# Patient Record
Sex: MALE | Race: WHITE | NOT HISPANIC OR LATINO | Employment: FULL TIME | URBAN - METROPOLITAN AREA
[De-identification: names, ages, dates, MRNs, and addresses within clinical notes are randomized per-mention and may not be internally consistent; named-entity substitution may affect disease eponyms.]

---

## 2017-01-25 ENCOUNTER — TELEPHONE (OUTPATIENT)
Dept: PULMONOLOGY | Facility: HOSPICE | Age: 57
End: 2017-01-25

## 2017-01-25 DIAGNOSIS — R06.00 DYSPNEA, UNSPECIFIED TYPE: ICD-10-CM

## 2017-02-24 ENCOUNTER — TELEPHONE (OUTPATIENT)
Dept: PULMONOLOGY | Facility: HOSPICE | Age: 57
End: 2017-02-24

## 2017-02-24 ENCOUNTER — OFFICE VISIT (OUTPATIENT)
Dept: PULMONOLOGY | Facility: HOSPICE | Age: 57
End: 2017-02-24
Payer: COMMERCIAL

## 2017-02-24 ENCOUNTER — NON-PROVIDER VISIT (OUTPATIENT)
Dept: PULMONOLOGY | Facility: HOSPICE | Age: 57
End: 2017-02-24
Payer: COMMERCIAL

## 2017-02-24 VITALS
SYSTOLIC BLOOD PRESSURE: 120 MMHG | RESPIRATION RATE: 17 BRPM | DIASTOLIC BLOOD PRESSURE: 64 MMHG | WEIGHT: 303 LBS | OXYGEN SATURATION: 89 % | TEMPERATURE: 97.9 F | HEIGHT: 60 IN | HEART RATE: 66 BPM | BODY MASS INDEX: 59.49 KG/M2

## 2017-02-24 VITALS — BODY MASS INDEX: 40.16 KG/M2 | HEIGHT: 73 IN | WEIGHT: 303 LBS

## 2017-02-24 DIAGNOSIS — G47.33 OBSTRUCTIVE SLEEP APNEA: ICD-10-CM

## 2017-02-24 DIAGNOSIS — R06.00 DYSPNEA, UNSPECIFIED TYPE: ICD-10-CM

## 2017-02-24 DIAGNOSIS — E66.09 NON MORBID OBESITY DUE TO EXCESS CALORIES: ICD-10-CM

## 2017-02-24 DIAGNOSIS — J45.30 MILD PERSISTENT ASTHMA WITHOUT COMPLICATION: ICD-10-CM

## 2017-02-24 PROCEDURE — 99204 OFFICE O/P NEW MOD 45 MIN: CPT | Performed by: INTERNAL MEDICINE

## 2017-02-24 RX ORDER — TRAMADOL HYDROCHLORIDE 50 MG/1
TABLET ORAL
COMMUNITY
Start: 2017-02-06

## 2017-02-24 RX ORDER — ZOLPIDEM TARTRATE 10 MG/1
TABLET ORAL
COMMUNITY
Start: 2017-02-06

## 2017-02-24 RX ORDER — ALBUTEROL SULFATE 90 UG/1
AEROSOL, METERED RESPIRATORY (INHALATION)
COMMUNITY
Start: 2017-02-17

## 2017-02-24 RX ORDER — LISINOPRIL 20 MG/1
TABLET ORAL
COMMUNITY
Start: 2017-01-15

## 2017-02-24 ASSESSMENT — PULMONARY FUNCTION TESTS
FEV1/FVC_PERCENT_PREDICTED: 89
FVC: 4.52
FEV1/FVC: 72.26
FEV1_PERCENT_PREDICTED: 76
FVC_PERCENT_PREDICTED: 80
FEV1_PERCENT_CHANGE: 0
FEV1: 3.11
FEV1_PREDICTED: 4.07
FVC: 4.29
FEV1/FVC_PERCENT_CHANGE: 0
FEV1_PERCENT_CHANGE: -5
FEV1: 3.1
FEV1_PERCENT_PREDICTED: 76
FVC_PREDICTED: 5.32
FEV1/FVC_PERCENT_PREDICTED: 77
FEV1/FVC_PERCENT_PREDICTED: 95
FEV1/FVC: 69
FVC_PERCENT_PREDICTED: 85

## 2017-02-24 NOTE — TELEPHONE ENCOUNTER
Echo from [facility] called requesting the ICD-10 code for dyspnea (R06.00) and code for consultation (46674).

## 2017-02-24 NOTE — MR AVS SNAPSHOT
"        Gurinder Buckner   2017 1:00 PM   Non-Provider Visit   MRN: [id number]    Department:  [facility]   Dept Phone:  [phone]    Description:  Male : 1960   Provider:  J Carlos Ovalles M.D.           Reason for Visit     Shortness of Breath           Allergies as of 2017     No Known Allergies      You were diagnosed with     Dyspnea, unspecified type   [[id number]]         Vital Signs     Height Weight Body Mass Index Smoking Status          1.842 m (6' 0.52\") 137.44 kg (303 lb) 40.51 kg/m2 Never Smoker         Basic Information     Date Of Birth Sex Race Ethnicity Preferred Language    1960 Male White Non- English      Your appointments     2017  2:00 PM   New Patient Pulmonary with J Carlos Ovalles M.D.   [facility] (--)    [address]  [address]  [place] [address] [phone]            Mar 08, 2017  5:30 PM   MR EXTREMITY WITHOUT (30) with [address] MRI 1   [facility] IMAGING - MRI - [address]    [address]  [place] [address] [phone]              Problem List              ICD-10-CM Priority Class Noted - Resolved    Abnormal stress ECG with treadmill R94.39   2012 - Present    S/P cardiac catheterization Z98.890   2012 - Present    Chest pain R07.9   2012 - Present    Essential hypertension I10 Medium  2012 - Present    Hypothyroidism E03.9 Medium  2012 - Present    Mixed hyperlipidemia E78.2 Medium  2012 - Present    Murmur R01.1   2012 - Present    Obesity E66.9 Medium  2012 - Present    Obstructive sleep apnea G47.33   2012 - Present    Palpitations R00.2   2012 - Present    Pulmonary Valve Stenosis Q22.3 High  2012 - Present    Mitral and aortic valve disease I08.0   2012 - Present    Erectile dysfunction N52.9 Low  2012 - Present    Chronic kidney disease (CKD), stage II (mild) N18.2 Medium  2012 - Present    DIABETES MELLITUS    " 2/16/2012 - Present    Right carotid bruit R09.89   9/13/2012 - Present    Internal hemorrhoids K64.8   11/10/2014 - Present    Other abnormal granulation tissue L91.8   12/31/2014 - Present      Health Maintenance        Date Due Completion Dates    IMM HEP B VACCINE (1 of 3 - Primary Series) 1960 ---    A1C SCREENING 1960 ---    DIABETES MONOFILAMENT / LE EXAM 1960 ---    RETINAL SCREENING 4/9/1979 ---    FASTING LIPID PROFILE 4/9/1979 ---    URINE ACR / MICROALBUMIN 4/9/1979 ---    IMM DTaP/Tdap/Td Vaccine (1 - Tdap) 4/9/1980 ---    IMM PNEUMOCOCCAL 19-64 (ADULT) MEDIUM RISK SERIES (1 of 1 - PPSV23) 4/9/1980 ---    COLONOSCOPY 4/9/2011 ---    SERUM CREATININE 12/31/2015 12/31/2014, 11/10/2014    IMM INFLUENZA (1) 9/1/2016 ---            Current Immunizations     Influenza TIV (IM) 11/1/2016      Below and/or attached are the medications your provider expects you to take. Review all of your home medications and newly ordered medications with your provider and/or pharmacist. Follow medication instructions as directed by your provider and/or pharmacist. Please keep your medication list with you and share with your provider. Update the information when medications are discontinued, doses are changed, or new medications (including over-the-counter products) are added; and carry medication information at all times in the event of emergency situations     Allergies:  No Known Allergies          Medications  Valid as of: February 24, 2017 -  1:54 PM    Generic Name Brand Name Tablet Size Instructions for use    Albuterol Sulfate (Aero Soln) VENTOLIN  (90 BASE) MCG/ACT         Docusate Sodium (Cap) COLACE 100 MG Take 1 Cap by mouth 2 times a day.        Levothyroxine Sodium (Tab) SYNTHROID 125 MCG Take 125 mcg by mouth 2 Times a Day. Take 2 tablets by mouth daily        Lisinopril (Tab) PRINIVIL 20 MG         Lisinopril-Hydrochlorothiazide (Tab) PRINZIDE, ZESTORETIC 20-25 MG Take 1 Tab by mouth every  day. Indications: High Blood Pressure        Metoprolol Succinate (TABLET SR 24 HR) TOPROL  MG Take 100 mg by mouth every morning.        Polyethylene Glycol 3350 (Powder) MIRALAX  Take 17 g by mouth every day.        RABEprazole Sodium (Tablet Delayed Response) ACIPHEX 20 MG Take 20 mg by mouth every day. Take on empty stomach in AM        Tamsulosin HCl (Cap) FLOMAX 0.4 MG Take 0.4 mg by mouth every evening.        TraMADol HCl (Tab) ULTRAM 50 MG         Zolpidem Tartrate (Tab) AMBIEN 10 MG         .                 Medicines prescribed today were sent to:     [address], [place] [address] [facility]    [address] [facility] [address]    Phone: [phone] Fax: [phone]    Open 24 Hours?: No      Medication refill instructions:       If your prescription bottle indicates you have medication refills left, it is not necessary to call your provider’s office. Please contact your pharmacy and they will refill your medication.    If your prescription bottle indicates you do not have any refills left, you may request refills at any time through one of the following ways: The online Setred system (except Urgent Care), by calling your provider’s office, or by asking your pharmacy to contact your provider’s office with a refill request. Medication refills are processed only during regular business hours and may not be available until the next business day. Your provider may request additional information or to have a follow-up visit with you prior to refilling your medication.   *Please Note: Medication refills are assigned a new Rx number when refilled electronically. Your pharmacy may indicate that no refills were authorized even though a new prescription for the same medication is available at the pharmacy. Please request the medicine by name with the pharmacy before contacting your provider for a refill.           Setred Access Code: [email or web address]  Expires: 3/26/2017  1:54  PM    [employer]  A secure, online tool to manage your health information     [employer]s Tweetwall® is a secure, online tool that connects you to your personalized health information from the privacy of your home -- day or night - making it very easy for you to manage your healthcare. Once the activation process is completed, you can even access your medical information using the Tweetwall alondra, which is available for free in the Apple Alondra store or Google Play store.     Tweetwall provides the following levels of access (as shown below):   My Chart Features   [facility] Doctor [facility]  Specialists [facility]  Urgent  Care Non-[facility]  Primary Care  Doctor   Email your healthcare team securely and privately 24/7 X X X    Manage appointments: schedule your next appointment; view details of past/upcoming appointments X      Request prescription refills. X      View recent personal medical records, including lab and immunizations X X X X   View health record, including health history, allergies, medications X X X X   Read reports about your outpatient visits, procedures, consult and ER notes X X X X   See your discharge summary, which is a recap of your hospital and/or ER visit that includes your diagnosis, lab results, and care plan. X X       How to register for Tweetwall:  1. Go to  [email or web address].  2. Click on the Sign Up Now box, which takes you to the New Member Sign Up page. You will need to provide the following information:  a. Enter your Tweetwall Access Code exactly as it appears at the top of this page. (You will not need to use this code after you’ve completed the sign-up process. If you do not sign up before the expiration date, you must request a new code.)   b. Enter your date of birth.   c. Enter your home email address.   d. Click Submit, and follow the next screen’s instructions.  3. Create a Tweetwall ID. This will be your Tweetwall login ID and cannot be changed, so think of one that is secure and  easy to remember.  4. Create a [employer] password. You can change your password at any time.  5. Enter your Password Reset Question and Answer. This can be used at a later time if you forget your password.   6. Enter your e-mail address. This allows you to receive e-mail notifications when new information is available in [employer].  7. Click Sign Up. You can now view your health information.    For assistance activating your [employer] account, call [phone]

## 2017-02-24 NOTE — PROGRESS NOTES
Gurinder Buckner is a 56 y.o. male here for obstructive sleep apnea and dyspnea on exertion.  Patient was referred by Dr. Karlo Armas.    History of Present Illness:    The patient is a 56-year-old male who comes in concerned about shortness of breath with exertion. He states that it started about a year ago when he had a groin surgery. He had an aspiration event at that time. After he was sent home he continued to have some wheezing and chest tightness. This seems to be improving as time goes on. He also has had symptoms of sleep apnea. He had a sleep study done in [place] and he has been prescribed a CPAP machine. He is going to  the machine today. He works at a [employer]. 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 He has gained about 25 pounds over the past year. He is trying to work on weight loss. He is now getting back to exercising. He has no chest pains or pleurisy. He denies any fevers or chills or night sweats. He denies any leg edema or calf tenderness. He's had no history of blood clots. Chest x-ray done in June 2016 was clear. Pulmonary function test done today show a mild degree of airflow obstruction. Lung volumes are normal. He has an increased diffusion capacity.    Constitutional:  Negative for fever, chills, sweats, and fatigue.  Eyes:  Negative for eye pain and visual changes.  HENT:  Negative for tinnitus and hoarse voice.  Cardiovascular:  Negative for chest pain, leg swelling, syncope and orthopnea.  Respiratory:  See HPI for pertinent negatives  Sleep: Positive for somnolence, loud snoring, sleep disturbance due to breathing, insomnia.  Gastrointestinal:  Negative for dysphagia, nausea and abdominal pain.  Heme/lymph:  Denies easy bruising, blood clots.  Musculoskeletal:  Negative for arthralgias, sore muscles and back pain.  Skin:  Negative for rash and color change.  Neurological:   Negative for headaches, lightheadedness and weakness.  Psychiatric:  Denies depression.    Current Outpatient Prescriptions   Medication Sig Dispense Refill   • VENTOLIN  (90 BASE) MCG/ACT Aero Soln inhalation aerosol      • zolpidem (AMBIEN) 10 MG Tab      • tramadol (ULTRAM) 50 MG Tab      • lisinopril (PRINIVIL) 20 MG Tab      • Mometasone Furo-Formoterol Fum (DULERA) 100-5 MCG/ACT Aerosol Inhale 2 Puffs by mouth 2 Times a Day. Use spacer. Rinse mouth after use. 1 Inhaler 0   • tamsulosin (FLOMAX) 0.4 MG capsule Take 0.4 mg by mouth every evening.     • rabeprazole (ACIPHEX) 20 MG tablet Take 20 mg by mouth every day. Take on empty stomach in AM     • lisinopril-hydrochlorothiazide (PRINZIDE, ZESTORETIC) 20-25 MG per tablet Take 1 Tab by mouth every day. Indications: High Blood Pressure     • levothyroxine (SYNTHROID) 125 MCG TABS Take 125 mcg by mouth 2 Times a Day. Take 2 tablets by mouth daily     • metoprolol SR (TOPROL XL) 100 MG TB24 Take 100 mg by mouth every morning.     • docusate sodium (COLACE) 100 MG CAPS Take 1 Cap by mouth 2 times a day. (Patient not taking: Reported on 2/24/2017) 60 Cap 0   • polyethylene glycol 3350 (MIRALAX) POWD Take 17 g by mouth every day. (Patient not taking: Reported on 2/24/2017) 1 Bottle 0     No current facility-administered medications for this visit.       Social History   Substance Use Topics   • Smoking status: Never Smoker    • Smokeless tobacco: None   • Alcohol Use: Yes      Comment: occasional       Past Medical History   Diagnosis Date   • Thyroid disease      Hypothyroidism, Thyroid Cancer   • Pulmonary Valve Stenosis 1/31/2012   • Hypertension 1/31/2012   • Hypothyroidism 1/31/2012   • Mixed hyperlipidemia 1/31/2012   • Obesity  1/31/2012   • Chronic kidney disease (CKD), stage II (mild) 2/16/2012   • Palpitations 1/31/2012   • Right carotid bruit 9/13/2012   • Heart murmur    • Asthma    • Cancer (CMS-HCC) 2009     Thyroid   • Obstructive Sleep Apnea  "1/31/2012     on cpap, but machine not working   • Anesthesia      doesn't wake up easy   • Urinary bladder disorder      on flomax   • Heart burn    • Indigestion        Past Surgical History   Procedure Laterality Date   • Tonsillectomy     • Thyroid lobectomy     • Exam under anesthesia  11/10/2014     Performed by Amado Lerma M.D. at [facility]   • Hemorrhoidectomy  11/10/2014     Performed by Amado Lerma M.D. at [facility]   • Rectal exploration  12/31/2014     Performed by Amado Lerma M.D. at [facility]   • Anal sphincterotomy  12/31/2014     Performed by Amado Lerma M.D. at [facility]   • Hemorrhoidectomy  12/31/2014     Performed by Amado Lrema M.D. at [facility]       Allergies:  Review of patient's allergies indicates no known allergies.    Family History   Problem Relation Age of Onset   • Lung Cancer Mother    • Lung Disease Father    • Cancer Sister    • Cancer Sister        Physical Examination    Filed Vitals:    02/24/17 1337   Height: 0.725 m (2' 4.54\")   Weight: 137.44 kg (303 lb)   Weight % change since last entry.: 0 %   BP: 120/64   Pulse: 66   BMI (Calculated): 261.48   Resp: 17   Temp: 36.6 °C (97.9 °F)       Physical Exam:  Constitutional:  Well developed and well nourished.  Head:  Normocephalic and atraumatic.  Nose:  Nose normal.  Mouth/Throat:  Oropharynx is clear and moist, no lesions.  Mallampati class IV  Eyes:  Conjunctivae and EOM are normal.  Pupils are equal, round, and reactive to light.  Neck:  Normal range of motion.  Supple.  No JVD. No tracheal deviation.  No thyromegally  Cardiovascular:  Normal rate, regular rhythm, normal heart sounds and intact distal pulses.  Pulmonary/Chest:  No accessory muscle use.  No wheezing, rales or rhonchi.  No dullness to percussion, tenderness or deformity.  Abdominal:  Soft.  No ascites.  No Hepatosplenomegally.  Non " tender.  Musculoskeletal.  Normal range of motion.  No muscular atrophy.  Lymphadenopathy:  No cervical or supraclavicular adenopathy  Neurological:  Alert and oriented.  Cranial nerves intact.  No focal deficits  Skin:  No rashes or ulcers.  Psyciatric:  Normal mood and affect.    Assessment and Plan:  1. Obstructive sleep apnea  The patient had a recent sleep study and he has been prescribed CPAP therapy. I have encouraged patient to use the CPAP machine at nighttime.    2. Mild persistent asthma without complication  I believe the patient has a mild degree of asthma. It almost sounds like a case of reactive airways dysfunction as he had a aspiration event and following this he started to have asthma symptoms. He can use a Dulera inhaler daily and then uses Proventil as needed. If this does not improve his symptoms and she will let us know.  - Mometasone Furo-Formoterol Fum (DULERA) 100-5 MCG/ACT Aerosol; Inhale 2 Puffs by mouth 2 Times a Day. Use spacer. Rinse mouth after use.  Dispense: 1 Inhaler; Refill: 0    3. Non morbid obesity due to excess calories  I do believe that a component of his shortness of breath is related to his sleep apnea and his obesity. I encouraged him to work on weight loss and exercise. I will also arrange for him to have an incentive spirometer. I would like and he uses 3-4 times a day. We'll plan to see him back in 6 months.  - DME OTHER          Followup Return in about 6 months (around 8/24/2017) for follow up with the pulmonary physician, follow up visit with HANNA.

## 2017-02-24 NOTE — PATIENT INSTRUCTIONS
1.  We have prescribed Dulera inhaler to be taken once or twice a day  2. Recommend continuing to take Proventil inhaler as needed  3. Recommend using CPAP machine nightly  4. Recommend using the incentive spirometer 3-4 times a day  5. Recommend exercise and weight loss  6. Recommend 6 month follow-up

## 2017-02-24 NOTE — MR AVS SNAPSHOT
"        Gurinder Buckner   2017 2:00 PM   Office Visit   MRN: [id number]    Department:  [facility]   Dept Phone:  [phone]    Description:  Male : 1960   Provider:  J Carlos Ovalles M.D.           Reason for Visit     Establish Care           Allergies as of 2017     No Known Allergies      You were diagnosed with     Obstructive sleep apnea   [192430]         Vital Signs     Blood Pressure Pulse Temperature Respirations Height Weight    120/64 mmHg 66 36.6 °C (97.9 °F) 17 0.725 m (2' 4.54\") 137.44 kg (303 lb)    Body Mass Index Oxygen Saturation Smoking Status             261.48 kg/m2 89% Never Smoker          Basic Information     Date Of Birth Sex Race Ethnicity Preferred Language    1960 Male White Non- English      Your appointments     Mar 08, 2017  5:30 PM   MR EXTREMITY WITHOUT (30) with [address] MRI 1   [facility] - MRI - [address]    [address]  [place] [address]   [phone]              Problem List              ICD-10-CM Priority Class Noted - Resolved    Abnormal stress ECG with treadmill R94.39   2012 - Present    S/P cardiac catheterization Z98.890   2012 - Present    Chest pain R07.9   2012 - Present    Essential hypertension I10 Medium  2012 - Present    Hypothyroidism E03.9 Medium  2012 - Present    Mixed hyperlipidemia E78.2 Medium  2012 - Present    Murmur R01.1   2012 - Present    Obesity E66.9 Medium  2012 - Present    Obstructive sleep apnea G47.33   2012 - Present    Palpitations R00.2   2012 - Present    Pulmonary Valve Stenosis Q22.3 High  2012 - Present    Mitral and aortic valve disease I08.0   2012 - Present    Erectile dysfunction N52.9 Low  2012 - Present    Chronic kidney disease (CKD), stage II (mild) N18.2 Medium  2012 - Present    DIABETES MELLITUS    2012 - Present    Right carotid bruit R09.89   2012 - Present    Internal hemorrhoids " K64.8   11/10/2014 - Present    Other abnormal granulation tissue L91.8   12/31/2014 - Present      Health Maintenance        Date Due Completion Dates    IMM HEP B VACCINE (1 of 3 - Primary Series) 1960 ---    A1C SCREENING 1960 ---    DIABETES MONOFILAMENT / LE EXAM 1960 ---    RETINAL SCREENING 4/9/1979 ---    FASTING LIPID PROFILE 4/9/1979 ---    URINE ACR / MICROALBUMIN 4/9/1979 ---    IMM DTaP/Tdap/Td Vaccine (1 - Tdap) 4/9/1980 ---    IMM PNEUMOCOCCAL 19-64 (ADULT) MEDIUM RISK SERIES (1 of 1 - PPSV23) 4/9/1980 ---    COLONOSCOPY 4/9/2011 ---    SERUM CREATININE 12/31/2015 12/31/2014, 11/10/2014    IMM INFLUENZA (1) 9/1/2016 ---            Current Immunizations     Influenza TIV (IM) 11/1/2016      Below and/or attached are the medications your provider expects you to take. Review all of your home medications and newly ordered medications with your provider and/or pharmacist. Follow medication instructions as directed by your provider and/or pharmacist. Please keep your medication list with you and share with your provider. Update the information when medications are discontinued, doses are changed, or new medications (including over-the-counter products) are added; and carry medication information at all times in the event of emergency situations     Allergies:  No Known Allergies          Medications  Valid as of: February 24, 2017 -  2:32 PM    Generic Name Brand Name Tablet Size Instructions for use    Albuterol Sulfate (Aero Soln) VENTOLIN  (90 BASE) MCG/ACT         Docusate Sodium (Cap) COLACE 100 MG Take 1 Cap by mouth 2 times a day.        Levothyroxine Sodium (Tab) SYNTHROID 125 MCG Take 125 mcg by mouth 2 Times a Day. Take 2 tablets by mouth daily        Lisinopril (Tab) PRINIVIL 20 MG         Lisinopril-Hydrochlorothiazide (Tab) PRINZIDE, ZESTORETIC 20-25 MG Take 1 Tab by mouth every day. Indications: High Blood Pressure        Metoprolol Succinate (TABLET SR 24 HR) TOPROL XL  100 MG Take 100 mg by mouth every morning.        Polyethylene Glycol 3350 (Powder) MIRALAX  Take 17 g by mouth every day.        RABEprazole Sodium (Tablet Delayed Response) ACIPHEX 20 MG Take 20 mg by mouth every day. Take on empty stomach in AM        Tamsulosin HCl (Cap) FLOMAX 0.4 MG Take 0.4 mg by mouth every evening.        TraMADol HCl (Tab) ULTRAM 50 MG         Zolpidem Tartrate (Tab) AMBIEN 10 MG         .                 Medicines prescribed today were sent to:     [address], [place] [address] [facility]    [address] [facility] [place] [address]    Phone: [phone] Fax: [phone]    Open 24 Hours?: No      Medication refill instructions:       If your prescription bottle indicates you have medication refills left, it is not necessary to call your provider’s office. Please contact your pharmacy and they will refill your medication.    If your prescription bottle indicates you do not have any refills left, you may request refills at any time through one of the following ways: The online Rockit Online system (except Urgent Care), by calling your provider’s office, or by asking your pharmacy to contact your provider’s office with a refill request. Medication refills are processed only during regular business hours and may not be available until the next business day. Your provider may request additional information or to have a follow-up visit with you prior to refilling your medication.   *Please Note: Medication refills are assigned a new Rx number when refilled electronically. Your pharmacy may indicate that no refills were authorized even though a new prescription for the same medication is available at the pharmacy. Please request the medicine by name with the pharmacy before contacting your provider for a refill.           Rockit Online Access Code: [email or web address]  Expires: 3/26/2017  1:54 PM    Rockit Online  A secure, online tool to manage your health information     [email or web address]’s "[employer]  is a secure, online tool that connects you to your personalized health information from the privacy of your home -- day or night - making it very easy for you to manage your healthcare. Once the activation process is completed, you can even access your medical information using the Lecorpio alondra, which is available for free in the Apple Alondra store or Google Play store.     Lecorpio provides the following levels of access (as shown below):   My Chart Features   [facility] Doctor [facility]  Specialists [facility]  Urgent  Care [facility]  Primary Care  Doctor   Email your healthcare team securely and privately 24/7 X X X    Manage appointments: schedule your next appointment; view details of past/upcoming appointments X      Request prescription refills. X      View recent personal medical records, including lab and immunizations X X X X   View health record, including health history, allergies, medications X X X X   Read reports about your outpatient visits, procedures, consult and ER notes X X X X   See your discharge summary, which is a recap of your hospital and/or ER visit that includes your diagnosis, lab results, and care plan. X X       How to register for Lecorpio:  1. Go to  [email or web address].  2. Click on the Sign Up Now box, which takes you to the New Member Sign Up page. You will need to provide the following information:  a. Enter your Lecorpio Access Code exactly as it appears at the top of this page. (You will not need to use this code after you’ve completed the sign-up process. If you do not sign up before the expiration date, you must request a new code.)   b. Enter your date of birth.   c. Enter your home email address.   d. Click Submit, and follow the next screen’s instructions.  3. Create a Critique^Itt ID. This will be your Lecorpio login ID and cannot be changed, so think of one that is secure and easy to remember.  4. Create a Lecorpio password. You can change your password at any  time.  5. Enter your Password Reset Question and Answer. This can be used at a later time if you forget your password.   6. Enter your e-mail address. This allows you to receive e-mail notifications when new information is available in BitInstantt.  7. Click Sign Up. You can now view your health information.    For assistance activating your Heyy account, call [phone]

## 2017-02-24 NOTE — PROCEDURES
Technician: ANTONIO Reinoso    Technician Comment:  Good patient effort & cooperation.  The results of this test meet the ATS/ERS standards for acceptability & reproducibility.  Test was performed on the eZWay Body Plethysmograph-Elite DX system.  Predicted values were [facility]-3 for spirometry, [facility] for DLCO, ITS for Lung Volumes.  The DLCO was uncorrected for Hgb.  A bronchodilator of Ventolin HFA -2puffs via spacer administered.    Interpretation:  Spirometry shows mild airflow obstruction and there was not a significant improvement after a bronchodilator. Lung volumes show mild air trapping. The diffusion capacity test is increased. Flow volume loops are within normal limits

## 2017-02-28 ENCOUNTER — TELEPHONE (OUTPATIENT)
Dept: PULMONOLOGY | Facility: HOSPICE | Age: 57
End: 2017-02-28

## 2017-02-28 DIAGNOSIS — J45.909 UNCOMPLICATED ASTHMA, UNSPECIFIED ASTHMA SEVERITY: ICD-10-CM

## 2017-02-28 NOTE — TELEPHONE ENCOUNTER
MEDICATION PRIOR AUTHORIZATION NEEDED:    1. Name of Medication: Dulera 100/5mcg    2. Requested By (Name of Pharmacy): [facility]     3. Is insurance on file current? yes    4. What is the name & phone number of the 3rd party payor? [facility] [phone]

## 2017-03-01 ENCOUNTER — TELEPHONE (OUTPATIENT)
Dept: PULMONOLOGY | Facility: HOSPICE | Age: 57
End: 2017-03-01

## 2017-03-01 NOTE — TELEPHONE ENCOUNTER
DOCUMENTATION OF PRIOR AUTH STATUS    1. Medication name and dose: Dulera 100/5mcg    2. Name and Phone # of Prescription coverage company: [employer] [phone]    3. Date Prior Auth was submitted: 2/28/2017    4. What information was given to obtain insurance decision: Clinical notes    5. Prior Auth letter Approved or Denied: Denied    6. Pharmacy notified: No    7. Patient notified: No        Dulera 100/5mcg was denied.  The pt needs to try Advair, Breo or Symbicort.  Dx is Asthma.  Please advise, thank you.

## 2017-03-08 ENCOUNTER — HOSPITAL ENCOUNTER (OUTPATIENT)
Dept: RADIOLOGY | Facility: MEDICAL CENTER | Age: 57
End: 2017-03-08
Attending: ORTHOPAEDIC SURGERY
Payer: COMMERCIAL

## 2017-03-08 DIAGNOSIS — M25.511 RIGHT SHOULDER PAIN, UNSPECIFIED CHRONICITY: ICD-10-CM

## 2017-03-08 PROCEDURE — 73221 MRI JOINT UPR EXTREM W/O DYE: CPT | Mod: RT

## 2017-10-16 ENCOUNTER — OFFICE VISIT (OUTPATIENT)
Dept: PULMONOLOGY | Facility: HOSPICE | Age: 57
End: 2017-10-16
Payer: COMMERCIAL

## 2017-10-16 ENCOUNTER — HOME STUDY (OUTPATIENT)
Dept: PULMONOLOGY | Facility: HOSPICE | Age: 57
End: 2017-10-16
Attending: INTERNAL MEDICINE
Payer: COMMERCIAL

## 2017-10-16 VITALS
BODY MASS INDEX: 40.16 KG/M2 | TEMPERATURE: 97.7 F | RESPIRATION RATE: 17 BRPM | OXYGEN SATURATION: 94 % | HEART RATE: 73 BPM | SYSTOLIC BLOOD PRESSURE: 148 MMHG | DIASTOLIC BLOOD PRESSURE: 86 MMHG | HEIGHT: 73 IN | WEIGHT: 303 LBS

## 2017-10-16 DIAGNOSIS — Z98.890 S/P CARDIAC CATHETERIZATION: ICD-10-CM

## 2017-10-16 DIAGNOSIS — G47.33 OBSTRUCTIVE SLEEP APNEA: ICD-10-CM

## 2017-10-16 DIAGNOSIS — R00.2 PALPITATIONS: ICD-10-CM

## 2017-10-16 PROCEDURE — 94762 N-INVAS EAR/PLS OXIMTRY CONT: CPT | Performed by: INTERNAL MEDICINE

## 2017-10-16 PROCEDURE — 99214 OFFICE O/P EST MOD 30 MIN: CPT | Performed by: INTERNAL MEDICINE

## 2017-10-16 NOTE — PATIENT INSTRUCTIONS
This gentleman comes in to follow-up on sleep apnea wearing CPAP. His machine is only 1-year-old, but has no chip to download. While this data is being obtained. He indicates that he wears at 5 nights a week out of 7, 46 hours per night. He does have full facemask. Some leak is evident.    He is not sure what settings he is on and does indicate that the machine sometimes feels very intense, at other times is very soft.    The second issue is he has mild reactive airway disease, delirium was very expensive, wheezing is not a major problem and rescue inhaler albuterol is adequate.    He has excessive weight, short chin, marked oropharyngeal crowding, weight loss with portion control and gentle exercises encouraged    The last issue is heart palpitations, he is seeing cardiology, A Dr. Swanson, and is undergone Holter monitor. I explained to him that if his downloading compliance data demonstrate good response to CPAP, with index less than 5 that the cardiac workup should proceed with cardiac issues as a primary basis. However if we see that his sleep apnea is not adequately treated, then we may need to adjust the CPAP settings. We are sending him to the sleep center today for mask fit, obtaining downloading compliance data by wireless technique, and checking the machine. We will also measure overnight oximetry at home on CPAP to make certain that his oxygenation and heart rate are corrected and measured in the home environment on CPAP.

## 2017-10-16 NOTE — PROGRESS NOTES
Gurinder Buckner is a 57 y.o. male here for sleep apnea on CPAP with palpitations and recent cardiac workup. Patient was referred by his primary care doctor.    History of Present Illness:        This gentleman comes in to follow-up on sleep apnea wearing CPAP. His machine is only 1-year-old, but has no chip to download. While this data is being obtained. He indicates that he wears at 5 nights a week out of 7, 46 hours per night. He does have full facemask. Some leak is evident.    He is not sure what settings he is on and does indicate that the machine sometimes feels very intense, at other times is very soft.    The second issue is he has mild reactive airway disease, delirium was very expensive, wheezing is not a major problem and rescue inhaler albuterol is adequate.    He has excessive weight, short chin, marked oropharyngeal crowding, weight loss with portion control and gentle exercises encouraged    The last issue is heart palpitations, he is seeing cardiology, A Dr. Swanson, and is undergone Holter monitor. I explained to him that if his downloading compliance data demonstrate good response to CPAP, with index less than 5 that the cardiac workup should proceed with cardiac issues as a primary basis. However if we see that his sleep apnea is not adequately treated, then we may need to adjust the CPAP settings. We are sending him to the sleep center today for mask fit, obtaining downloading compliance data by wireless technique, and checking the machine. We will also measure overnight oximetry at home on CPAP to make certain that his oxygenation and heart rate are corrected and measured in the home environment on CPAP.    Constitutional ROS: No unexpected change in weight, No unexplained fevers  Eyes: No change in vision or blurring or double vision  Mouth/Throat ROS: No sore throat, No recent change in voice or hoarseness  Pulmonary ROS: See present history for pertinent positives  Cardiovascular ROS: No  chest pain to suggest acute coronary syndrome; palpitations currently under evaluation by cardiology  Gastrointestinal ROS: No abdominal pain to suggest peptic disease  Musculoskeletal/Extremities ROS: no acute artritis or unusual swelling  Hematologic/Lymphatic ROS: No easy bleeding or unusual lymph node swelling  Neurologic ROS: No new or unusual weakness  Psychiatric ROS: No hallucinations  Allergic/Immunologic: No  urticaria or allergic rash      Current Outpatient Prescriptions   Medication Sig Dispense Refill   • Fluticasone Furoate-Vilanterol (BREO ELLIPTA) 100-25 MCG/INH AEROSOL POWDER, BREATH ACTIVATED Take 1 Inhalation by mouth every day. Rinse mouth after use. 1 Each 5   • VENTOLIN  (90 BASE) MCG/ACT Aero Soln inhalation aerosol      • zolpidem (AMBIEN) 10 MG Tab      • tramadol (ULTRAM) 50 MG Tab      • lisinopril (PRINIVIL) 20 MG Tab      • Mometasone Furo-Formoterol Fum (DULERA) 100-5 MCG/ACT Aerosol Inhale 2 Puffs by mouth 2 Times a Day. Use spacer. Rinse mouth after use. 1 Inhaler 0   • docusate sodium (COLACE) 100 MG CAPS Take 1 Cap by mouth 2 times a day. (Patient not taking: Reported on 2/24/2017) 60 Cap 0   • polyethylene glycol 3350 (MIRALAX) POWD Take 17 g by mouth every day. (Patient not taking: Reported on 2/24/2017) 1 Bottle 0   • tamsulosin (FLOMAX) 0.4 MG capsule Take 0.4 mg by mouth every evening.     • rabeprazole (ACIPHEX) 20 MG tablet Take 20 mg by mouth every day. Take on empty stomach in AM     • lisinopril-hydrochlorothiazide (PRINZIDE, ZESTORETIC) 20-25 MG per tablet Take 1 Tab by mouth every day. Indications: High Blood Pressure     • levothyroxine (SYNTHROID) 125 MCG TABS Take 125 mcg by mouth 2 Times a Day. Take 2 tablets by mouth daily     • metoprolol SR (TOPROL XL) 100 MG TB24 Take 100 mg by mouth every morning.       No current facility-administered medications for this visit.        Social History   Substance Use Topics   • Smoking status: Never Smoker   • Smokeless  tobacco: Never Used   • Alcohol use Yes      Comment: occasional        Past Medical History:   Diagnosis Date   • Right carotid bruit 9/13/2012   • Chronic kidney disease (CKD), stage II (mild) 2/16/2012   • Pulmonary Valve Stenosis 1/31/2012   • Hypertension 1/31/2012   • Hypothyroidism 1/31/2012   • Mixed hyperlipidemia 1/31/2012   • Obesity  1/31/2012   • Palpitations 1/31/2012   • Obstructive Sleep Apnea 1/31/2012    on cpap, but machine not working   • Cancer (CMS-HCC) 2009    Thyroid   • Anesthesia     doesn't wake up easy   • Asthma    • Heart burn    • Heart murmur    • Indigestion    • Thyroid disease     Hypothyroidism, Thyroid Cancer   • Urinary bladder disorder     on flomax       Past Surgical History:   Procedure Laterality Date   • RECTAL EXPLORATION  12/31/2014    Performed by Amado Lerma M.D. at [facility]ERY [facility]   • ANAL SPHINCTEROTOMY  12/31/2014    Performed by Amado Lerma M.D. at SUR[facility]ERY [facility]   • HEMORRHOIDECTOMY  12/31/2014    Performed by Amado Lerma M.D. at SUR[facility]ERY [facility]   • EXAM UNDER ANESTHESIA  11/10/2014    Performed by Amado Lerma M.D. at SUR[facility]   • HEMORRHOIDECTOMY  11/10/2014    Performed by Amado Lerma M.D. at SUR[facility]ERY [facility]   • THYROID LOBECTOMY     • TONSILLECTOMY         Allergies: Review of patient's allergies indicates no known allergies.    Family History   Problem Relation Age of Onset   • Lung Cancer Mother    • Lung Disease Father    • Cancer Sister    • Cancer Sister        Physical Examination    Vitals:    10/16/17 1303   BP: 148/86   Pulse: 73   Resp: 17   Temp: 36.5 °C (97.7 °F)       General Appearance: alert, no distress  Skin: Skin color, texture, turgor normal. No rashes or lesions.  Eyes: negative  Oropharynx: Lips, mucosa, and tongue normal. Teeth and gums normal. Oropharynx moist and without lesion  Lungs: positive findings: Wheezes with forced exhalation  Heart:  negative. RRR without murmur, gallop, or rubs.  No ectopy.  Abdomen: Abdomen soft, non-tender. . 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 Obstructive sleep apnea  - OVERNIGHT OXIMETRY; Future    2. S/P cardiac catheterization    3. Palpitations  - OVERNIGHT OXIMETRY; Future    4. Class 2 obesity due to excess calories with serious comorbidity and body mass index (BMI) of 37.0 to 37.9 in adult      This gentleman comes in to follow-up on sleep apnea wearing CPAP. His machine is only 1-year-old, but has no chip to download. While this data is being obtained. He indicates that he wears at 5 nights a week out of 7, 46 hours per night. He does have full facemask. Some leak is evident.    He is not sure what settings he is on and does indicate that the machine sometimes feels very intense, at other times is very soft.    The second issue is he has mild reactive airway disease, delirium was very expensive, wheezing is not a major problem and rescue inhaler albuterol is adequate.    He has excessive weight, short chin, marked oropharyngeal crowding, weight loss with portion control and gentle exercises encouraged    The last issue is heart palpitations, he is seeing cardiology, A Dr. Swanson, and is undergone Holter monitor. I explained to him that if his downloading compliance data demonstrate good response to CPAP, with index less than 5 that the cardiac workup should proceed with cardiac issues as a primary basis. However if we see that his sleep apnea is not adequately treated, then we may need to adjust the CPAP settings. We are sending him to the sleep center today for mask  fit, obtaining downloading compliance data by wireless technique, and checking the machine. We will also measure overnight oximetry at home on CPAP to make certain that his oxygenation and heart rate are corrected and measured in the home environment on CPAP.  Followup Return in about 6 weeks (around 11/27/2017) for follow up visit with sleep provider.

## 2017-10-16 NOTE — LETTER
María Elena Atkins M.D.  [facility] Pulmonary Medicine   [address] [place] [address] [place] [address]  Phone: [phone] - Fax: [phone]           Encounter Date: 10/16/2017  Provider: María Elena Atkins M.D.  Location of Care: [facility] PULMONARY MEDICINE      Patient:   Gurinder Buckner   MR Number: [id number]   YOB: 1960     PROGRESS NOTE:  Gurinder Buckner is a 57 y.o. male here for sleep apnea on CPAP with palpitations and recent cardiac workup. Patient was referred by his primary care doctor.    History of Present Illness:        This gentleman comes in to follow-up on sleep apnea wearing CPAP. His machine is only 1-year-old, but has no chip to download. While this data is being obtained. He indicates that he wears at 5 nights a week out of 7, 46 hours per night. He does have full facemask. Some leak is evident.    He is not sure what settings he is on and does indicate that the machine sometimes feels very intense, at other times is very soft.    The second issue is he has mild reactive airway disease, delirium was very expensive, wheezing is not a major problem and rescue inhaler albuterol is adequate.    He has excessive weight, short chin, marked oropharyngeal crowding, weight loss with portion control and gentle exercises encouraged    The last issue is heart palpitations, he is seeing cardiology, A Dr. Swanson, and is undergone Holter monitor. I explained to him that if his downloading compliance data demonstrate good response to CPAP, with index less than 5 that the cardiac workup should proceed with cardiac issues as a primary basis. However if we see that his sleep apnea is not adequately treated, then we may need to adjust the CPAP settings. We are sending him to the sleep center today for mask fit, obtaining downloading compliance data by wireless technique, and checking the machine. We will also measure overnight oximetry at home on CPAP to make  certain that his oxygenation and heart rate are corrected and measured in the home environment on CPAP.    Constitutional ROS: No unexpected change in weight, No unexplained fevers  Eyes: No change in vision or blurring or double vision  Mouth/Throat ROS: No sore throat, No recent change in voice or hoarseness  Pulmonary ROS: See present history for pertinent positives  Cardiovascular ROS: No chest pain to suggest acute coronary syndrome; palpitations currently under evaluation by cardiology  Gastrointestinal ROS: No abdominal pain to suggest peptic disease  Musculoskeletal/Extremities ROS: no acute artritis or unusual swelling  Hematologic/Lymphatic ROS: No easy bleeding or unusual lymph node swelling  Neurologic ROS: No new or unusual weakness  Psychiatric ROS: No hallucinations  Allergic/Immunologic: No  urticaria or allergic rash      Current Outpatient Prescriptions   Medication Sig Dispense Refill   • Fluticasone Furoate-Vilanterol (BREO ELLIPTA) 100-25 MCG/INH AEROSOL POWDER, BREATH ACTIVATED Take 1 Inhalation by mouth every day. Rinse mouth after use. 1 Each 5   • VENTOLIN  (90 BASE) MCG/ACT Aero Soln inhalation aerosol      • zolpidem (AMBIEN) 10 MG Tab      • tramadol (ULTRAM) 50 MG Tab      • lisinopril (PRINIVIL) 20 MG Tab      • Mometasone Furo-Formoterol Fum (DULERA) 100-5 MCG/ACT Aerosol Inhale 2 Puffs by mouth 2 Times a Day. Use spacer. Rinse mouth after use. 1 Inhaler 0   • docusate sodium (COLACE) 100 MG CAPS Take 1 Cap by mouth 2 times a day. (Patient not taking: Reported on 2/24/2017) 60 Cap 0   • polyethylene glycol 3350 (MIRALAX) POWD Take 17 g by mouth every day. (Patient not taking: Reported on 2/24/2017) 1 Bottle 0   • tamsulosin (FLOMAX) 0.4 MG capsule Take 0.4 mg by mouth every evening.     • rabeprazole (ACIPHEX) 20 MG tablet Take 20 mg by mouth every day. Take on empty stomach in AM     • lisinopril-hydrochlorothiazide (PRINZIDE, ZESTORETIC) 20-25 MG per tablet Take 1 Tab by mouth  every day. Indications: High Blood Pressure     • levothyroxine (SYNTHROID) 125 MCG TABS Take 125 mcg by mouth 2 Times a Day. Take 2 tablets by mouth daily     • metoprolol SR (TOPROL XL) 100 MG TB24 Take 100 mg by mouth every morning.       No current facility-administered medications for this visit.        Social History   Substance Use Topics   • Smoking status: Never Smoker   • Smokeless tobacco: Never Used   • Alcohol use Yes      Comment: occasional        Past Medical History:   Diagnosis Date   • Right carotid bruit 9/13/2012   • Chronic kidney disease (CKD), stage II (mild) 2/16/2012   • Pulmonary Valve Stenosis 1/31/2012   • Hypertension 1/31/2012   • Hypothyroidism 1/31/2012   • Mixed hyperlipidemia 1/31/2012   • Obesity  1/31/2012   • Palpitations 1/31/2012   • Obstructive Sleep Apnea 1/31/2012    on cpap, but machine not working   • Cancer (CMS-[facility]) 2009    Thyroid   • Anesthesia     doesn't wake up easy   • Asthma    • Heart burn    • Heart murmur    • Indigestion    • Thyroid disease     Hypothyroidism, Thyroid Cancer   • Urinary bladder disorder     on flomax       Past Surgical History:   Procedure Laterality Date   • RECTAL EXPLORATION  12/31/2014    Performed by Amado Lerma M.D. at SURGERY [facility]   • ANAL SPHINCTEROTOMY  12/31/2014    Performed by Amado Lerma M.D. at SURGERY [facility]   • HEMORRHOIDECTOMY  12/31/2014    Performed by Amado Lerma M.D. at SURGERY [facility]   • EXAM UNDER ANESTHESIA  11/10/2014    Performed by Amado Lerma M.D. at SURGERY [facility]   • HEMORRHOIDECTOMY  11/10/2014    Performed by Amado Lerma M.D. at SURGERY [facility]   • THYROID LOBECTOMY     • TONSILLECTOMY         Allergies: Review of patient's allergies indicates no known allergies.    Family History   Problem Relation Age of Onset   • Lung Cancer Mother    • Lung Disease Father    • Cancer Sister    • Cancer Sister        Physical  Examination    Vitals:    10/16/17 1303   BP: 148/86   Pulse: 73   Resp: 17   Temp: 36.5 °C (97.7 °F)       General Appearance: alert, no distress  Skin: Skin color, texture, turgor normal. No rashes or lesions.  Eyes: negative  Oropharynx: Lips, mucosa, and tongue normal. Teeth and gums normal. Oropharynx moist and without lesion  Lungs: positive findings: Wheezes with forced exhalation  Heart: negative. RRR without murmur, gallop, or rubs.  No ectopy.  Abdomen: Abdomen soft, non-tender. . 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 Obstructive sleep apnea  - OVERNIGHT OXIMETRY; Future    2. S/P cardiac catheterization    3. Palpitations  - OVERNIGHT OXIMETRY; Future    4. Class 2 obesity due to excess calories with serious comorbidity and body mass index (BMI) of 37.0 to 37.9 in adult      This gentleman comes in to follow-up on sleep apnea wearing CPAP. His machine is only 1-year-old, but has no chip to download. While this data is being obtained. He indicates that he wears at 5 nights a week out of 7, 46 hours per night. He does have full facemask. Some leak is evident.    He is not sure what settings he is on and does indicate that the machine sometimes feels very intense, at other times is very soft.    The second issue is he has mild reactive airway disease, delirium was very expensive, wheezing is not a major problem and rescue inhaler albuterol is adequate.    He has excessive weight, short chin, marked oropharyngeal crowding, weight loss with portion control and gentle exercises encouraged    The last issue is heart palpitations, he is seeing cardiology, ALYCIA Hare  Sky, and is undergone Holter monitor. I explained to him that if his downloading compliance data demonstrate good response to CPAP, with index less than 5 that the cardiac workup should proceed with cardiac issues as a primary basis. However if we see that his sleep apnea is not adequately treated, then we may need to adjust the CPAP settings. We are sending him to the sleep center today for mask fit, obtaining downloading compliance data by wireless technique, and checking the machine. We will also measure overnight oximetry at home on CPAP to make certain that his oxygenation and heart rate are corrected and measured in the home environment on CPAP.  Followup Return in about 6 weeks (around 11/27/2017) for follow up visit with sleep provider.        Electronically signed by María Elena Atkins M.D.  on 10/16/17    No Recipients

## 2017-10-16 NOTE — LETTER
María Elena Atkins M.D.  [facility] Pulmonary Medicine   [address] [place] [address] [place] [address]  Phone: [phone] - Fax: [phone]           Encounter Date: 10/16/2017  Provider: María Elena Atkins M.D.  Location of Care: [facility] PULMONARY MEDICINE      Patient:   Gurinder Buckner   MR Number: [id number]   YOB: 1960     PROGRESS NOTE:  Gurinder Buckner is a 57 y.o. male here for sleep apnea on CPAP with palpitations and recent cardiac workup. Patient was referred by his primary care doctor.    History of Present Illness:        This gentleman comes in to follow-up on sleep apnea wearing CPAP. His machine is only 1-year-old, but has no chip to download. While this data is being obtained. He indicates that he wears at 5 nights a week out of 7, 46 hours per night. He does have full facemask. Some leak is evident.    He is not sure what settings he is on and does indicate that the machine sometimes feels very intense, at other times is very soft.    The second issue is he has mild reactive airway disease, delirium was very expensive, wheezing is not a major problem and rescue inhaler albuterol is adequate.    He has excessive weight, short chin, marked oropharyngeal crowding, weight loss with portion control and gentle exercises encouraged    The last issue is heart palpitations, he is seeing cardiology, A Dr. Swanson, and is undergone Holter monitor. I explained to him that if his downloading compliance data demonstrate good response to CPAP, with index less than 5 that the cardiac workup should proceed with cardiac issues as a primary basis. However if we see that his sleep apnea is not adequately treated, then we may need to adjust the CPAP settings. We are sending him to the sleep center today for mask fit, obtaining downloading compliance data by wireless technique, and checking the machine. We will also measure overnight oximetry at home on CPAP to make  certain that his oxygenation and heart rate are corrected and measured in the home environment on CPAP.    Constitutional ROS: No unexpected change in weight, No unexplained fevers  Eyes: No change in vision or blurring or double vision  Mouth/Throat ROS: No sore throat, No recent change in voice or hoarseness  Pulmonary ROS: See present history for pertinent positives  Cardiovascular ROS: No chest pain to suggest acute coronary syndrome; palpitations currently under evaluation by cardiology  Gastrointestinal ROS: No abdominal pain to suggest peptic disease  Musculoskeletal/Extremities ROS: no acute artritis or unusual swelling  Hematologic/Lymphatic ROS: No easy bleeding or unusual lymph node swelling  Neurologic ROS: No new or unusual weakness  Psychiatric ROS: No hallucinations  Allergic/Immunologic: No  urticaria or allergic rash      Current Outpatient Prescriptions   Medication Sig Dispense Refill   • Fluticasone Furoate-Vilanterol (BREO ELLIPTA) 100-25 MCG/INH AEROSOL POWDER, BREATH ACTIVATED Take 1 Inhalation by mouth every day. Rinse mouth after use. 1 Each 5   • VENTOLIN  (90 BASE) MCG/ACT Aero Soln inhalation aerosol      • zolpidem (AMBIEN) 10 MG Tab      • tramadol (ULTRAM) 50 MG Tab      • lisinopril (PRINIVIL) 20 MG Tab      • Mometasone Furo-Formoterol Fum (DULERA) 100-5 MCG/ACT Aerosol Inhale 2 Puffs by mouth 2 Times a Day. Use spacer. Rinse mouth after use. 1 Inhaler 0   • docusate sodium (COLACE) 100 MG CAPS Take 1 Cap by mouth 2 times a day. (Patient not taking: Reported on 2/24/2017) 60 Cap 0   • polyethylene glycol 3350 (MIRALAX) POWD Take 17 g by mouth every day. (Patient not taking: Reported on 2/24/2017) 1 Bottle 0   • tamsulosin (FLOMAX) 0.4 MG capsule Take 0.4 mg by mouth every evening.     • rabeprazole (ACIPHEX) 20 MG tablet Take 20 mg by mouth every day. Take on empty stomach in AM     • lisinopril-hydrochlorothiazide (PRINZIDE, ZESTORETIC) 20-25 MG per tablet Take 1 Tab by mouth  every day. Indications: High Blood Pressure     • levothyroxine (SYNTHROID) 125 MCG TABS Take 125 mcg by mouth 2 Times a Day. Take 2 tablets by mouth daily     • metoprolol SR (TOPROL XL) 100 MG TB24 Take 100 mg by mouth every morning.       No current facility-administered medications for this visit.        Social History   Substance Use Topics   • Smoking status: Never Smoker   • Smokeless tobacco: Never Used   • Alcohol use Yes      Comment: occasional        Past Medical History:   Diagnosis Date   • Right carotid bruit 9/13/2012   • Chronic kidney disease (CKD), stage II (mild) 2/16/2012   • Pulmonary Valve Stenosis 1/31/2012   • Hypertension 1/31/2012   • Hypothyroidism 1/31/2012   • Mixed hyperlipidemia 1/31/2012   • Obesity  1/31/2012   • Palpitations 1/31/2012   • Obstructive Sleep Apnea 1/31/2012    on cpap, but machine not working   • Cancer (CMS-[facility]) 2009    Thyroid   • Anesthesia     doesn't wake up easy   • Asthma    • Heart burn    • Heart murmur    • Indigestion    • Thyroid disease     Hypothyroidism, Thyroid Cancer   • Urinary bladder disorder     on flomax       Past Surgical History:   Procedure Laterality Date   • RECTAL EXPLORATION  12/31/2014    Performed by Amado Lerma M.D. at SURGERY [facility]   • ANAL SPHINCTEROTOMY  12/31/2014    Performed by Amado Lerma M.D. at SURGERY [facility]   • HEMORRHOIDECTOMY  12/31/2014    Performed by Amado Lerma M.D. at SURGERY [facility]   • EXAM UNDER ANESTHESIA  11/10/2014    Performed by Amado Lerma M.D. at SURGERY [facility]   • HEMORRHOIDECTOMY  11/10/2014    Performed by Amado Lerma M.D. at SURGERY [facility]   • THYROID LOBECTOMY     • TONSILLECTOMY         Allergies: Review of patient's allergies indicates no known allergies.    Family History   Problem Relation Age of Onset   • Lung Cancer Mother    • Lung Disease Father    • Cancer Sister    • Cancer Sister        Physical  Examination    Vitals:    10/16/17 1303   BP: 148/86   Pulse: 73   Resp: 17   Temp: 36.5 °C (97.7 °F)       General Appearance: alert, no distress  Skin: Skin color, texture, turgor normal. No rashes or lesions.  Eyes: negative  Oropharynx: Lips, mucosa, and tongue normal. Teeth and gums normal. Oropharynx moist and without lesion  Lungs: positive findings: Wheezes with forced exhalation  Heart: negative. RRR without murmur, gallop, or rubs.  No ectopy.  Abdomen: Abdomen soft, non-tender. . 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 Obstructive sleep apnea  - OVERNIGHT OXIMETRY; Future    2. S/P cardiac catheterization    3. Palpitations  - OVERNIGHT OXIMETRY; Future    4. Class 2 obesity due to excess calories with serious comorbidity and body mass index (BMI) of 37.0 to 37.9 in adult      This gentleman comes in to follow-up on sleep apnea wearing CPAP. His machine is only 1-year-old, but has no chip to download. While this data is being obtained. He indicates that he wears at 5 nights a week out of 7, 46 hours per night. He does have full facemask. Some leak is evident.    He is not sure what settings he is on and does indicate that the machine sometimes feels very intense, at other times is very soft.    The second issue is he has mild reactive airway disease, delirium was very expensive, wheezing is not a major problem and rescue inhaler albuterol is adequate.    He has excessive weight, short chin, marked oropharyngeal crowding, weight loss with portion control and gentle exercises encouraged    The last issue is heart palpitations, he is seeing cardiology, ALYCIA Hare  Sky, and is undergone Holter monitor. I explained to him that if his downloading compliance data demonstrate good response to CPAP, with index less than 5 that the cardiac workup should proceed with cardiac issues as a primary basis. However if we see that his sleep apnea is not adequately treated, then we may need to adjust the CPAP settings. We are sending him to the sleep center today for mask fit, obtaining downloading compliance data by wireless technique, and checking the machine. We will also measure overnight oximetry at home on CPAP to make certain that his oxygenation and heart rate are corrected and measured in the home environment on CPAP.  Followup Return in about 6 weeks (around 11/27/2017) for follow up visit with sleep provider.        Electronically signed by María Elena Atkins M.D.  on 10/16/17    Fazal Scott M.D.  [address] [place] [id number]  VIA Facsimile: [phone]

## 2017-10-17 NOTE — PROCEDURES
Pulmonary oximetry performed on CPAP shows that saturations are generally maintained, some brief desaturations, as low as 86% but on reviewing the saturation trended appears that CPAP generally maintained saturation other than very mild decrease, below 90% 37% of the time monitored but the average low was 87%. Clinical correlation required to determine whether or not there is a need to add supplemental oxygen for this very mild decrease, we'll possibly increase the CPAP if that option exists.

## 2018-06-14 NOTE — TELEPHONE ENCOUNTER
Left the pt a  mssg regarding the denial of Dulera.  I need to let him know that the Dulera was denied and that we sent Jagdish Brooks to his pharmacy for him to try instead.   Principal Discharge DX:	Abdominal pain

## 2019-03-20 ENCOUNTER — TELEPHONE (OUTPATIENT)
Dept: HEMATOLOGY ONCOLOGY | Facility: MEDICAL CENTER | Age: 59
End: 2019-03-20

## 2019-03-20 NOTE — TELEPHONE ENCOUNTER
Spoke with patient regarding his NP genetics appointment, he needs to see his work schedule before scheduling any appointments. He will call me back to schedule. I gave him the direct scheduling number. NP/ Brigid/ Hemochromatosis/ Yuli Hemphill

## 2019-03-29 NOTE — PROGRESS NOTES
"Prechart clinical data and referral information reviewed 3/29 [id number]  Counseling and testing information and materials prepared     \"I spent 30 minutes on 3/29  reviewing past clinical and management records, prior to  visit.\"   "

## 2019-04-01 ENCOUNTER — TELEPHONE (OUTPATIENT)
Dept: HEMATOLOGY ONCOLOGY | Facility: MEDICAL CENTER | Age: 59
End: 2019-04-01

## 2019-04-01 ENCOUNTER — APPOINTMENT (OUTPATIENT)
Dept: HEMATOLOGY ONCOLOGY | Facility: MEDICAL CENTER | Age: 59
End: 2019-04-01

## 2019-04-01 NOTE — TELEPHONE ENCOUNTER
1st attempt:     Left a message for the patient to return my call to reschedule his appointment with . Patient left a message this morning stating he had to work.